# Patient Record
Sex: MALE
[De-identification: names, ages, dates, MRNs, and addresses within clinical notes are randomized per-mention and may not be internally consistent; named-entity substitution may affect disease eponyms.]

---

## 2018-01-02 ENCOUNTER — RX RENEWAL (OUTPATIENT)
Age: 73
End: 2018-01-02

## 2021-10-07 ENCOUNTER — APPOINTMENT (OUTPATIENT)
Dept: THORACIC SURGERY | Facility: CLINIC | Age: 76
End: 2021-10-07
Payer: MEDICARE

## 2021-10-07 PROCEDURE — 99203 OFFICE O/P NEW LOW 30 MIN: CPT | Mod: 95

## 2021-10-11 NOTE — HISTORY OF PRESENT ILLNESS
[FreeTextEntry1] : This patient is a 76-year-old gentleman that underwent a robotic assisted left lobectomy several months ago.  The patient states that during the procedure there was a rib fracture.  That has healed although the patient now states that there is a protuberance in his left chest wall.  Most recent CAT scan shows a healed old rib fracture without any other issues.  The patient is having a telehealth visit to discuss the protuberance on his chest wall and rib pain.  The patient has tried to take medication including gabapentin for the chronic pain.  It has not resolved.  The patient underwent chemotherapy and is scheduled to start radiation therapy for 3 a lung cancer.

## 2021-10-11 NOTE — ASSESSMENT
[FreeTextEntry1] : This patient is a 76-year-old gentleman who is status post lobe lung lobectomy for 3 a lung cancer.  The patient has undergone chemotherapy and will undergo radiation therapy.  The patient is still complaining of pain and a protuberance on his chest wall.  CAT scan reveals a healed rib fracture that most likely occurred during the surgery.  At this point I would recommend conservative therapy including medication, local treatment, or a pain medicine specialist for consideration of injection.  I would not recommend any surgery for this area of concern.  I am afraid that any surgery would lead to worse pain and symptoms than what he has now.  If the patient has any further issues he will give my office call.